# Patient Record
Sex: FEMALE | Race: WHITE | URBAN - METROPOLITAN AREA
[De-identification: names, ages, dates, MRNs, and addresses within clinical notes are randomized per-mention and may not be internally consistent; named-entity substitution may affect disease eponyms.]

---

## 2023-08-04 ENCOUNTER — TELEMEDICINE (OUTPATIENT)
Dept: INTERNAL MEDICINE CLINIC | Facility: CLINIC | Age: 43
End: 2023-08-04

## 2023-08-04 VITALS — WEIGHT: 220 LBS | HEIGHT: 66 IN | BODY MASS INDEX: 35.36 KG/M2

## 2023-08-04 DIAGNOSIS — E66.9 OBESITY (BMI 30-39.9): ICD-10-CM

## 2023-08-04 DIAGNOSIS — F41.9 ANXIETY: ICD-10-CM

## 2023-08-04 DIAGNOSIS — F43.9 STRESS: ICD-10-CM

## 2023-08-04 DIAGNOSIS — E04.1 THYROID NODULE: ICD-10-CM

## 2023-08-04 DIAGNOSIS — Z51.81 THERAPEUTIC DRUG MONITORING: Primary | ICD-10-CM

## 2023-08-04 PROCEDURE — 99203 OFFICE O/P NEW LOW 30 MIN: CPT | Performed by: NURSE PRACTITIONER

## 2023-08-04 NOTE — PATIENT INSTRUCTIONS
Welcome to the [facility]!!  Thank you for placing your trust in our health care team, I look forward to working with you along this journey to better health! Next steps:     1.  Schedule a personal nutrition consultation with one of our registered dieticians. Bring along your food journal (3 days minimum). See journal options below. 2.  get blood work and outpatient EKG done   3. Try out some pre-made meal options: ginny mcelroy MD, metabolic meals, Factor 75, Freshly      Please try to work on the following dietary changes this first month:  Daily protein recommendation to start:  grams  Daily carbohydrate: <120g  Daily calories: 1,400-1,500  1. Drink water with meals and throughout the day, cut down on soda and/or juice if consumed. Consider flavored water options like Bubbly, Spindrift, Hint and Soham. 2.  Eat breakfast daily and focus on having protein with each meal, examples include: greek yogurt, cottage cheese, hard boiled egg, whole grain toast with peanut butter. 3.  Reduce refined carbohydrates and sugars which includes items such as sweets, as well as rice, pasta, and bread and make sure to choose whole grain options when having them with just 1 serving per meal about the size of your inner palm. 4.  Consume non starchy veggies daily working towards making them a good 50% of your daily food intake. Add them to lunch and dinner consistently. 5.  Optional can start a daily probiotic: VSL#3, (order on line at www.vsl3. com). Take 1 capsule daily with water for 30 days, then reduce to 1 every other day (this will reduce the cost). Capsules can be left out for 2 weeks, but then must be refrigerated. Please download shahla My Fitness Orvel Dubin! Or Net Diary to monitor daily dietary intake and you will be able to see if you are eating the right amount of calories or too much or too little which would hinder weight loss.  Additionally this will help to see your daily carbohydrate and protein intake. When you set the zayra up choose 1-2 lbs/week as a goal.  Keeping a paper food journal is an option as well to remain accountable for your choices- this is the start to mindful eating! A low calorie diet has been consistently shown to support weight loss. Continue or start exercising to help establish a routine. If not already exercising begin with 1 day and progress as able with long-term goal of 30 minutes 5 days a week at a minimum. Meditation daily can help manage and control stress. Chronic stress can make weight loss difficult. Exercising is one way to help with stress, but meditation using the CALM Zayra or another comparable alternative can be done in your home or place of work with little time commitment. This Zayra can also help work on behavior change and improve sleep. Check out the segment under Calm Masterclass and listen to The 4 Pillars of Health. A great way to begin learning about the foundation of lifestyle with practical tips to use in your every day. Check out www.yourweightmatters. org blog for continued daily support and education along this weight loss journey! Patient Resources:     Personal Training/Fitness Classes/Health Coaching     Bev Rivera and [place]os Sophiaside @ [email or web address]/ Full fitness center with group fitness and personal training. Discount available as client of [facility]. Health Coaching and Personal Training with Ousmane Bradshaw at our [facility]- individual weekly coaching with option to add personal training and small group fitness classes targeted at weight loss- [phone] and/or email @ [email or web address]  [email or web address] Eliza [email or web address] Group Fitness [phone] and/or email Aidan Kamara at [email or web address]  [address] with multiple locations: LastSeleromiguelina (www.Body & Soul), Eat The Dillon & Minor (www.eatthefrogAcucar Guarani. com), Niche Fitness (www.Rem[employer] Pharm[employer]. Wistone), The Exercise  (www.exercisecoach.Wistone)     Online Fitness  Fitness  on [employer] in 10 DVD series- www. yirkh61JQR. Wistone  Sit and Be Fit - Chair exercise series Www.sitandbefit. org  Hip Hop Fit with Brian Hernadez at www.hiphopfit. net     Apps for on [employer] 7 Minute Workout (orange box with white 7) - free on the go HIIT training zayra  Peloton Zayra @ wwwSafehis     Nutrition Trackers and Tools  LoseIT! And My Fitness Pal apps and on line for tracking nutrition  NOOM - virtual health coaching  FitFoundation (healthy meals on the go) in [facility] @ [employer] [email or web address]. Norberto Núñez MD @ wwwEmpower Interactive Group and Gwenith Goldberg (keto and low carb plans recommended) @ www. [email or web address].[email or web address], Metabolic Meals @ www. MoveaMetabolicMeals. com - individual prepared meals to go  ZenDeals, [employer], Every Plate, Causes- on line meal delivery programs for preparation at home  [employer] in [place] for homemade meals to go @ wwwLiquid GridsmealBangoage. Wistone  Diet Doctor @ www. dietdoctor. Wistone - low carb swaps  Medabil - meal prep and planning zayra (www.yummly. com)     Stress Management/Behavior/Mindful Eating  CALM meditation zayra (www.calmWaybeo Inc)  Headspace  Am I Hungry? Mindful eating virtual  zayra  Www.yourweightmatters. org - Obesity Action Coalition sponsored Blog posts daily  Motivation zayra (black box with white \")- daily supportive messages sent to your phone     Books/Video Education/Podcasts  Mindless Eating by Jamila Singh  Why We Get Sick by Jessy Tyler (a book about insulin resistance)  Atomic Habits by Isaura Mckeon (a book about taking small steps to promote greater behavior change)   Can't Hurt Me by Mariam Arroyo (a book exploring the power of discipline in achieving your goals)  The End of Dieting: How to Live for Life by Dr. Saqib Garcia M.D. or listen to The [facility] Episode 61:  Understanding \"Nutritarian\" Eating w/Dr. Saqib Garcia  Your Body in Balance: The [employer] of Food, Hormones, and Health by Dr. Andrei Valadez  The Menopause Diet Plan by Saulobailee D[facility] AT [facility]  The Complete Guide to fasting by Dr. Gloria Segura, [address] [facility] by Alejandrina Mishra, Ph.D, R.D. Weight Loss Surgery Will Not Treat Food Addiction by Hafsa Hurtado Ph.D  The [address] [place] [address] on plant based nutrition  Fed Up - documentary about obesity (Free on New 16 [employer]n)  The Truth About Sugar - documentary on sugar (Free on Sliced Apples, https://[email or web address] be/8E3wvodQQ0t)  The Dr. Rupert Bundy by Dr. Delano Henley MD  Fitlosophy Fitspiration - journal to better health (found at [employer] in fitness aisle)  What Happened to You?- a look at the impact trauma has on behavior written by Gregory Juarez and Dr. Diana Russo Again by Carmine Mendoza - discovering your true self after trauma  Vickey Cota talk on PublicStuff, The Call to Courage  Podcasts: The Exam Room by the Physician's Committee, Nutrition Facts by Dr. Peggy Corbin    We are here to support you with weight loss, but please remember that you still need your primary care provider for your routine health maintenance.

## 2023-08-05 ENCOUNTER — LAB ENCOUNTER (OUTPATIENT)
Dept: LAB | Age: 43
End: 2023-08-05
Attending: NURSE PRACTITIONER
Payer: COMMERCIAL

## 2023-08-05 DIAGNOSIS — E66.9 OBESITY (BMI 30-39.9): ICD-10-CM

## 2023-08-05 DIAGNOSIS — Z51.81 THERAPEUTIC DRUG MONITORING: ICD-10-CM

## 2023-08-05 LAB
ALBUMIN SERPL-MCNC: 3.9 G/DL (ref 3.4–5)
ALBUMIN/GLOB SERPL: 1 {RATIO} (ref 1–2)
ALP LIVER SERPL-CCNC: 80 U/L
ALT SERPL-CCNC: 86 U/L
ANION GAP SERPL CALC-SCNC: 3 MMOL/L (ref 0–18)
AST SERPL-CCNC: 59 U/L (ref 15–37)
BASOPHILS # BLD AUTO: 0.04 X10(3) UL (ref 0–0.2)
BASOPHILS NFR BLD AUTO: 0.8 %
BILIRUB SERPL-MCNC: 0.6 MG/DL (ref 0.1–2)
BUN BLD-MCNC: 14 MG/DL (ref 7–18)
CALCIUM BLD-MCNC: 9 MG/DL (ref 8.5–10.1)
CHLORIDE SERPL-SCNC: 107 MMOL/L (ref 98–112)
CHOLEST SERPL-MCNC: 241 MG/DL (ref ?–200)
CO2 SERPL-SCNC: 25 MMOL/L (ref 21–32)
CREAT BLD-MCNC: 0.83 MG/DL
EGFRCR SERPLBLD CKD-EPI 2021: 89 ML/MIN/1.73M2 (ref 60–?)
EOSINOPHIL # BLD AUTO: 0.08 X10(3) UL (ref 0–0.7)
EOSINOPHIL NFR BLD AUTO: 1.6 %
ERYTHROCYTE [DISTWIDTH] IN BLOOD BY AUTOMATED COUNT: 14.3 %
EST. AVERAGE GLUCOSE BLD GHB EST-MCNC: 117 MG/DL (ref 68–126)
FASTING PATIENT LIPID ANSWER: YES
FASTING STATUS PATIENT QL REPORTED: YES
GLOBULIN PLAS-MCNC: 3.8 G/DL (ref 2.8–4.4)
GLUCOSE BLD-MCNC: 98 MG/DL (ref 70–99)
HBA1C MFR BLD: 5.7 % (ref ?–5.7)
HCT VFR BLD AUTO: 41.9 %
HDLC SERPL-MCNC: 67 MG/DL (ref 40–59)
HGB BLD-MCNC: 12.9 G/DL
IMM GRANULOCYTES # BLD AUTO: 0.02 X10(3) UL (ref 0–1)
IMM GRANULOCYTES NFR BLD: 0.4 %
LDLC SERPL CALC-MCNC: 163 MG/DL (ref ?–100)
LYMPHOCYTES # BLD AUTO: 1.66 X10(3) UL (ref 1–4)
LYMPHOCYTES NFR BLD AUTO: 32.7 %
MCH RBC QN AUTO: 27.4 PG (ref 26–34)
MCHC RBC AUTO-ENTMCNC: 30.8 G/DL (ref 31–37)
MCV RBC AUTO: 89.1 FL
MONOCYTES # BLD AUTO: 0.56 X10(3) UL (ref 0.1–1)
MONOCYTES NFR BLD AUTO: 11 %
NEUTROPHILS # BLD AUTO: 2.72 X10 (3) UL (ref 1.5–7.7)
NEUTROPHILS # BLD AUTO: 2.72 X10(3) UL (ref 1.5–7.7)
NEUTROPHILS NFR BLD AUTO: 53.5 %
NONHDLC SERPL-MCNC: 174 MG/DL (ref ?–130)
OSMOLALITY SERPL CALC.SUM OF ELEC: 280 MOSM/KG (ref 275–295)
PLATELET # BLD AUTO: 274 10(3)UL (ref 150–450)
POTASSIUM SERPL-SCNC: 4.3 MMOL/L (ref 3.5–5.1)
PROT SERPL-MCNC: 7.7 G/DL (ref 6.4–8.2)
RBC # BLD AUTO: 4.7 X10(6)UL
SODIUM SERPL-SCNC: 135 MMOL/L (ref 136–145)
T4 FREE SERPL-MCNC: 0.9 NG/DL (ref 0.8–1.7)
TRIGL SERPL-MCNC: 65 MG/DL (ref 30–149)
TSI SER-ACNC: 0.43 MIU/ML (ref 0.36–3.74)
VIT B12 SERPL-MCNC: 310 PG/ML (ref 193–986)
VIT D+METAB SERPL-MCNC: 18.3 NG/ML (ref 30–100)
VLDLC SERPL CALC-MCNC: 13 MG/DL (ref 0–30)
WBC # BLD AUTO: 5.1 X10(3) UL (ref 4–11)

## 2023-08-05 PROCEDURE — 83036 HEMOGLOBIN GLYCOSYLATED A1C: CPT

## 2023-08-05 PROCEDURE — 80053 COMPREHEN METABOLIC PANEL: CPT

## 2023-08-05 PROCEDURE — 84443 ASSAY THYROID STIM HORMONE: CPT

## 2023-08-05 PROCEDURE — 36415 COLL VENOUS BLD VENIPUNCTURE: CPT

## 2023-08-05 PROCEDURE — 84439 ASSAY OF FREE THYROXINE: CPT

## 2023-08-05 PROCEDURE — 82607 VITAMIN B-12: CPT

## 2023-08-05 PROCEDURE — 82306 VITAMIN D 25 HYDROXY: CPT

## 2023-08-05 PROCEDURE — 85025 COMPLETE CBC W/AUTO DIFF WBC: CPT

## 2023-08-05 PROCEDURE — 80061 LIPID PANEL: CPT

## 2023-08-07 ENCOUNTER — PATIENT MESSAGE (OUTPATIENT)
Dept: INTERNAL MEDICINE CLINIC | Facility: CLINIC | Age: 43
End: 2023-08-07

## 2023-08-07 NOTE — PROGRESS NOTES
Elevated blood sugar (a1c) it is in the prediabetes range: 5.7% I recommend avoiding carbs, exercise, and possibly starting a medication, which will help with weight loss, prediabetes. Low Vitamin d: please start taking ergocalciferol 50,000 U q week 16 weeks (please order #16 no refill). Then start daily maintenance vitamin D 2000 Units  Mildly low Vitamin B12: please start daily over the counter B complex vitamin  Thyroid:  stable  Elevated liver enzymes- has you have ever been diagnosis with fatty liver? Any alcohol use? Extra tylenol?   Cholesterol: (elevated total and LDL)-  recommend exercise, weight loss, increasing fiber, fish and nuts

## 2023-08-09 NOTE — TELEPHONE ENCOUNTER
From: Carmela Martinez  To: DRAINEL Severino  Sent: 8/7/2023 1:09 PM CDT  Subject: Labs in     My labs are in. It's pretty bad (compared to how I was). I am following the diet very strictly though and plan on getting the cholesterol and blood sugar down with strict diet.

## 2023-08-11 ENCOUNTER — PATIENT MESSAGE (OUTPATIENT)
Dept: INTERNAL MEDICINE CLINIC | Facility: CLINIC | Age: 43
End: 2023-08-11

## 2023-08-12 ENCOUNTER — EKG ENCOUNTER (OUTPATIENT)
Dept: LAB | Age: 43
End: 2023-08-12
Attending: NURSE PRACTITIONER
Payer: COMMERCIAL

## 2023-08-12 DIAGNOSIS — Z51.81 THERAPEUTIC DRUG MONITORING: ICD-10-CM

## 2023-08-12 DIAGNOSIS — E66.9 OBESITY (BMI 30-39.9): ICD-10-CM

## 2023-08-12 PROCEDURE — 93005 ELECTROCARDIOGRAM TRACING: CPT

## 2023-08-12 PROCEDURE — 93010 ELECTROCARDIOGRAM REPORT: CPT | Performed by: INTERNAL MEDICINE

## 2023-08-13 LAB
ATRIAL RATE: 67 BPM
P AXIS: 27 DEGREES
P-R INTERVAL: 138 MS
Q-T INTERVAL: 436 MS
QRS DURATION: 94 MS
QTC CALCULATION (BEZET): 460 MS
R AXIS: -25 DEGREES
T AXIS: 27 DEGREES
VENTRICULAR RATE: 67 BPM

## 2023-08-13 NOTE — TELEPHONE ENCOUNTER
From: Carmela Martinez  To: DARINEL Severino  Sent: 8/11/2023 11:28 AM CDT  Subject: Question regarding COMP METABOLIC PANEL (14)    I will begin the vitamin d prescription immediately along with OTC B12. I have been religiously tracking my diet in shahla and in one week I'm down 5.5 pounds. I'm strictly following the guidelines talked about previously. I am very committed to getting my numbers healthy. My liver concerns me. I've never been diagnosed with any liver issues. I drink on average 2-3 days a week. What medication is recommended now for weight/diabetes at stage I'm at? Thank you.

## 2023-08-14 RX ORDER — PHENTERMINE HYDROCHLORIDE 15 MG/1
15 CAPSULE ORAL EVERY MORNING
Qty: 30 CAPSULE | Refills: 1 | Status: SHIPPED | OUTPATIENT
Start: 2023-08-14

## 2024-01-30 ENCOUNTER — WALK IN (OUTPATIENT)
Dept: URGENT CARE | Age: 44
End: 2024-01-30

## 2024-01-30 VITALS
DIASTOLIC BLOOD PRESSURE: 82 MMHG | TEMPERATURE: 98.2 F | SYSTOLIC BLOOD PRESSURE: 114 MMHG | RESPIRATION RATE: 18 BRPM | OXYGEN SATURATION: 98 % | HEART RATE: 86 BPM

## 2024-01-30 DIAGNOSIS — R05.1 ACUTE COUGH: Primary | ICD-10-CM

## 2024-01-30 PROCEDURE — 99203 OFFICE O/P NEW LOW 30 MIN: CPT | Performed by: STUDENT IN AN ORGANIZED HEALTH CARE EDUCATION/TRAINING PROGRAM

## 2024-01-30 RX ORDER — BENZONATATE 100 MG/1
CAPSULE ORAL
COMMUNITY
Start: 2024-01-24

## 2024-01-30 RX ORDER — ALBUTEROL SULFATE 90 UG/1
2 AEROSOL, METERED RESPIRATORY (INHALATION) EVERY 4 HOURS PRN
Qty: 1 EACH | Refills: 0 | Status: SHIPPED | OUTPATIENT
Start: 2024-01-30

## 2024-01-30 ASSESSMENT — ENCOUNTER SYMPTOMS
SORE THROAT: 1
SHORTNESS OF BREATH: 0
WEAKNESS: 0
NUMBNESS: 0
WHEEZING: 0
CHILLS: 0
RHINORRHEA: 0
SINUS PRESSURE: 0
HEADACHES: 1
ABDOMINAL PAIN: 0
DIARRHEA: 0
VOMITING: 0
COUGH: 1
FEVER: 0
NAUSEA: 0
SINUS PAIN: 0
UNEXPECTED WEIGHT CHANGE: 0